# Patient Record
Sex: FEMALE | Race: BLACK OR AFRICAN AMERICAN
[De-identification: names, ages, dates, MRNs, and addresses within clinical notes are randomized per-mention and may not be internally consistent; named-entity substitution may affect disease eponyms.]

---

## 2017-08-03 ENCOUNTER — HOSPITAL ENCOUNTER (EMERGENCY)
Dept: HOSPITAL 35 - ER | Age: 42
Discharge: HOME | End: 2017-08-03
Payer: COMMERCIAL

## 2017-08-03 VITALS — SYSTOLIC BLOOD PRESSURE: 159 MMHG | DIASTOLIC BLOOD PRESSURE: 84 MMHG

## 2017-08-03 VITALS — BODY MASS INDEX: 18.48 KG/M2 | WEIGHT: 114.99 LBS | HEIGHT: 66 IN

## 2017-08-03 DIAGNOSIS — I10: ICD-10-CM

## 2017-08-03 DIAGNOSIS — Z98.890: ICD-10-CM

## 2017-08-03 DIAGNOSIS — F12.10: ICD-10-CM
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
## 2017-08-03 DIAGNOSIS — S46.912A: Primary | ICD-10-CM

## 2017-08-03 DIAGNOSIS — F10.99: ICD-10-CM

## 2017-08-03 DIAGNOSIS — Z90.49: ICD-10-CM
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
## 2017-08-03 DIAGNOSIS — Z90.710: ICD-10-CM
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
## 2018-02-22 ENCOUNTER — HOSPITAL ENCOUNTER (EMERGENCY)
Dept: HOSPITAL 35 - ER | Age: 43
Discharge: HOME | End: 2018-02-22
Payer: COMMERCIAL

## 2018-02-22 VITALS — BODY MASS INDEX: 18.96 KG/M2 | WEIGHT: 117.99 LBS | HEIGHT: 66 IN

## 2018-02-22 DIAGNOSIS — Z87.891: ICD-10-CM

## 2018-02-22 DIAGNOSIS — Z90.49: ICD-10-CM
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
## 2018-02-22 DIAGNOSIS — I10: ICD-10-CM
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
## 2018-02-22 DIAGNOSIS — S30.0XXA: Primary | ICD-10-CM

## 2018-02-22 DIAGNOSIS — Z90.710: ICD-10-CM

## 2019-07-07 ENCOUNTER — HOSPITAL ENCOUNTER (EMERGENCY)
Dept: HOSPITAL 35 - ER | Age: 44
Discharge: LEFT BEFORE DISCHARGE | End: 2019-07-07
Payer: COMMERCIAL

## 2019-07-07 VITALS — DIASTOLIC BLOOD PRESSURE: 92 MMHG | SYSTOLIC BLOOD PRESSURE: 151 MMHG

## 2019-07-07 DIAGNOSIS — Z53.21: Primary | ICD-10-CM

## 2019-07-08 ENCOUNTER — HOSPITAL ENCOUNTER (EMERGENCY)
Dept: HOSPITAL 35 - ER | Age: 44
Discharge: HOME | End: 2019-07-08
Payer: COMMERCIAL

## 2019-07-08 VITALS
DIASTOLIC BLOOD PRESSURE: 89 MMHG | WEIGHT: 139.99 LBS | SYSTOLIC BLOOD PRESSURE: 138 MMHG | BODY MASS INDEX: 22.5 KG/M2 | HEIGHT: 66 IN

## 2019-07-08 DIAGNOSIS — Z90.49: ICD-10-CM

## 2019-07-08 DIAGNOSIS — G57.01: Primary | ICD-10-CM

## 2019-07-08 DIAGNOSIS — I10: ICD-10-CM

## 2019-07-08 DIAGNOSIS — Z87.891: ICD-10-CM

## 2019-07-08 DIAGNOSIS — Z90.710: ICD-10-CM

## 2021-04-28 ENCOUNTER — HOSPITAL ENCOUNTER (EMERGENCY)
Dept: HOSPITAL 35 - ER | Age: 46
Discharge: HOME | End: 2021-04-28
Payer: COMMERCIAL

## 2021-04-28 VITALS — HEIGHT: 66 IN | WEIGHT: 132.01 LBS | BODY MASS INDEX: 21.22 KG/M2

## 2021-04-28 VITALS — DIASTOLIC BLOOD PRESSURE: 74 MMHG | SYSTOLIC BLOOD PRESSURE: 124 MMHG

## 2021-04-28 DIAGNOSIS — M51.26: ICD-10-CM

## 2021-04-28 DIAGNOSIS — Z90.49: ICD-10-CM

## 2021-04-28 DIAGNOSIS — R10.31: ICD-10-CM

## 2021-04-28 DIAGNOSIS — K59.00: Primary | ICD-10-CM

## 2021-04-28 DIAGNOSIS — Z98.890: ICD-10-CM

## 2021-04-28 DIAGNOSIS — I10: ICD-10-CM

## 2021-04-28 DIAGNOSIS — Z79.899: ICD-10-CM

## 2021-04-28 DIAGNOSIS — Z87.891: ICD-10-CM

## 2021-04-28 LAB
ALBUMIN SERPL-MCNC: 4.3 G/DL (ref 3.4–5)
ALT SERPL-CCNC: 19 U/L (ref 30–65)
ANION GAP SERPL CALC-SCNC: 9 MMOL/L (ref 7–16)
AST SERPL-CCNC: 14 U/L (ref 15–37)
BASOPHILS NFR BLD AUTO: 0.9 % (ref 0–2)
BILIRUB SERPL-MCNC: 0.6 MG/DL (ref 0.2–1)
BILIRUB UR-MCNC: NEGATIVE MG/DL
BUN SERPL-MCNC: 11 MG/DL (ref 7–18)
CALCIUM SERPL-MCNC: 9.3 MG/DL (ref 8.5–10.1)
CHLORIDE SERPL-SCNC: 99 MMOL/L (ref 98–107)
CO2 SERPL-SCNC: 29 MMOL/L (ref 21–32)
COLOR UR: YELLOW
CREAT SERPL-MCNC: 0.8 MG/DL (ref 0.6–1)
EOSINOPHIL NFR BLD: 2.3 % (ref 0–3)
ERYTHROCYTE [DISTWIDTH] IN BLOOD BY AUTOMATED COUNT: 14 % (ref 10.5–14.5)
GLUCOSE SERPL-MCNC: 110 MG/DL (ref 74–106)
GRANULOCYTES NFR BLD MANUAL: 51.6 % (ref 36–66)
HCT VFR BLD CALC: 41.1 % (ref 37–47)
HGB BLD-MCNC: 13.9 GM/DL (ref 12–15)
KETONES UR STRIP-MCNC: NEGATIVE MG/DL
LIPASE: 108 U/L (ref 73–393)
LYMPHOCYTES NFR BLD AUTO: 32.7 % (ref 24–44)
MCH RBC QN AUTO: 28.2 PG (ref 26–34)
MCHC RBC AUTO-ENTMCNC: 33.9 G/DL (ref 28–37)
MCV RBC: 83.4 FL (ref 80–100)
MONOCYTES NFR BLD: 12.5 % (ref 1–8)
NEUTROPHILS # BLD: 2 THOU/UL (ref 1.4–8.2)
PLATELET # BLD: 280 THOU/UL (ref 150–400)
POTASSIUM SERPL-SCNC: 3 MMOL/L (ref 3.5–5.1)
PROT SERPL-MCNC: 7.5 G/DL (ref 6.4–8.2)
RBC # BLD AUTO: 4.92 MIL/UL (ref 4.2–5)
RBC # UR STRIP: NEGATIVE /UL
SODIUM SERPL-SCNC: 137 MMOL/L (ref 136–145)
SP GR UR STRIP: 1.02 (ref 1–1.03)
URINE CLARITY: CLEAR
URINE GLUCOSE-RANDOM*: NEGATIVE
URINE LEUKOCYTES-REFLEX: NEGATIVE
URINE NITRITE-REFLEX: NEGATIVE
URINE PROTEIN (DIPSTICK): (no result)
UROBILINOGEN UR STRIP-ACNC: 2 E.U./DL (ref 0.2–1)
WBC # BLD AUTO: 3.9 THOU/UL (ref 4–11)

## 2022-01-24 ENCOUNTER — HOSPITAL ENCOUNTER (EMERGENCY)
Dept: HOSPITAL 35 - ER | Age: 47
Discharge: HOME | End: 2022-01-24
Payer: COMMERCIAL

## 2022-01-24 VITALS — WEIGHT: 148 LBS | BODY MASS INDEX: 23.78 KG/M2 | HEIGHT: 66 IN

## 2022-01-24 VITALS — DIASTOLIC BLOOD PRESSURE: 78 MMHG | SYSTOLIC BLOOD PRESSURE: 138 MMHG

## 2022-01-24 DIAGNOSIS — Z87.891: ICD-10-CM

## 2022-01-24 DIAGNOSIS — I10: ICD-10-CM

## 2022-01-24 DIAGNOSIS — Z90.710: ICD-10-CM

## 2022-01-24 DIAGNOSIS — R10.10: ICD-10-CM

## 2022-01-24 DIAGNOSIS — Z79.899: ICD-10-CM

## 2022-01-24 DIAGNOSIS — K44.9: Primary | ICD-10-CM

## 2022-01-24 DIAGNOSIS — R07.89: ICD-10-CM

## 2022-01-24 DIAGNOSIS — Z90.49: ICD-10-CM

## 2022-01-24 LAB
ALBUMIN SERPL-MCNC: 4.4 G/DL (ref 3.4–5)
ALT SERPL-CCNC: 24 U/L (ref 14–59)
ANION GAP SERPL CALC-SCNC: 11 MMOL/L (ref 7–16)
AST SERPL-CCNC: 18 U/L (ref 15–37)
BASOPHILS NFR BLD AUTO: 1.4 % (ref 0–2)
BILIRUB DIRECT SERPL-MCNC: 0.2 MG/DL
BILIRUB SERPL-MCNC: 0.4 MG/DL (ref 0.2–1)
BUN SERPL-MCNC: 14 MG/DL (ref 7–18)
CALCIUM SERPL-MCNC: 10.2 MG/DL (ref 8.5–10.1)
CHLORIDE SERPL-SCNC: 94 MMOL/L (ref 98–107)
CO2 SERPL-SCNC: 26 MMOL/L (ref 21–32)
CREAT SERPL-MCNC: 0.8 MG/DL (ref 0.6–1)
EOSINOPHIL NFR BLD: 2.9 % (ref 0–3)
ERYTHROCYTE [DISTWIDTH] IN BLOOD BY AUTOMATED COUNT: 13.7 % (ref 10.5–14.5)
GLUCOSE SERPL-MCNC: 112 MG/DL (ref 74–106)
GRANULOCYTES NFR BLD MANUAL: 42.7 % (ref 36–66)
HCT VFR BLD CALC: 43.8 % (ref 37–47)
HGB BLD-MCNC: 14.4 GM/DL (ref 12–15)
LIPASE: 92 U/L (ref 73–393)
LYMPHOCYTES NFR BLD AUTO: 40.2 % (ref 24–44)
MCH RBC QN AUTO: 27.2 PG (ref 26–34)
MCHC RBC AUTO-ENTMCNC: 32.9 G/DL (ref 28–37)
MCV RBC: 82.7 FL (ref 80–100)
MONOCYTES NFR BLD: 12.8 % (ref 1–8)
NEUTROPHILS # BLD: 1.9 THOU/UL (ref 1.4–8.2)
PLATELET # BLD: 315 THOU/UL (ref 150–400)
POTASSIUM SERPL-SCNC: 3 MMOL/L (ref 3.5–5.1)
PROT SERPL-MCNC: 7.8 G/DL (ref 6.4–8.2)
RBC # BLD AUTO: 5.29 MIL/UL (ref 4.2–5)
SODIUM SERPL-SCNC: 131 MMOL/L (ref 136–145)
WBC # BLD AUTO: 4.5 THOU/UL (ref 4–11)

## 2022-01-25 NOTE — EKG
[address]  [address]
Phone:  [phone]                    ELECTROCARDIOGRAM REPORT      
_______________________________________________________________________________
 
Name:       VIKA HAGEN            [facility] #[facility]                     [id number]     Account #:      [id number]  
Admission:  22    Attend Phys:                          
Discharge:  22    Date of Birth:  [birth year]  
                                                          Report #: [id number]
   [id number]
_______________________________________________________________________________
                         [facility] ED
                                       
Test Date:    2022               Test Time:    19:39:06
Pat Name:     VIKA HAGEN             Department:   
Patient ID:   [id number]            Room:          
Gender:       F                        Technician:   afshin
:          1975               Requested By: Adrian Rodriguez
Order Number: [id number] MD:   Reji Bah
                                 Measurements
Intervals                              Axis          
Rate:         98                       P:            69
IA:           156                      QRS:          -32
QRSD:         96                       T:            38
QT:           384                                    
QTc:          491                                    
                           Interpretive Statements
Sinus rhythm
Biatrial enlargement
Left axis deviation
Anteroseptal infarct, age indeterminate
Compared to ECG 09/15/2016 08:11:01
Sinus tachycardia no longer present
Electronically Signed On 2022 7:51:34 CST by Reji Bah
[email or web address]
 
 
 
 
 
 
 
 
 
 
 
 
 
 
 
 
 
 
 
  <ELECTRONICALLY SIGNED>
   By: Reji Bah MD, [facility]   
  22     [id number]
D: 22 1941                           _____________________________________
T: 22                           Reji Bah MD, FACC     /EPI